# Patient Record
Sex: FEMALE | URBAN - METROPOLITAN AREA
[De-identification: names, ages, dates, MRNs, and addresses within clinical notes are randomized per-mention and may not be internally consistent; named-entity substitution may affect disease eponyms.]

---

## 2023-10-09 ENCOUNTER — APPOINTMENT (RX ONLY)
Dept: URBAN - METROPOLITAN AREA CLINIC 88 | Facility: CLINIC | Age: 58
Setting detail: DERMATOLOGY
End: 2023-10-09

## 2023-10-09 ENCOUNTER — RX ONLY (OUTPATIENT)
Age: 58
Setting detail: RX ONLY
End: 2023-10-09

## 2023-10-09 DIAGNOSIS — Z01.818 ENCOUNTER FOR OTHER PREPROCEDURAL EXAMINATION: ICD-10-CM

## 2023-10-09 PROCEDURE — ? ADDITIONAL NOTES

## 2023-10-09 RX ORDER — CEPHALEXIN 500 MG/1
CAPSULE ORAL
Qty: 21 | Refills: 2 | Status: ERX | COMMUNITY
Start: 2023-10-09

## 2023-10-09 RX ORDER — OXYCODONE HYDROCHLORIDE AND ACETAMINOPHEN 5; 325 MG/1; MG/1
TABLET ORAL
Qty: 30 | Refills: 0 | Status: ERX | COMMUNITY
Start: 2023-10-09

## 2023-10-09 RX ORDER — APREPITANT 40 MG/1
CAPSULE ORAL
Qty: 1 | Refills: 0 | Status: ERX | COMMUNITY
Start: 2023-10-09

## 2023-10-09 RX ORDER — PROMETHAZINE HYDROCHLORIDE 12.5 MG/1
TABLET ORAL
Qty: 20 | Refills: 0 | Status: ERX | COMMUNITY
Start: 2023-10-09

## 2023-10-09 RX ORDER — CYCLOBENZAPRINE HYDROCHLORIDE 5 MG/1
TABLET, FILM COATED ORAL
Qty: 30 | Refills: 0 | Status: ERX | COMMUNITY
Start: 2023-10-09

## 2023-10-09 NOTE — PROCEDURE: ADDITIONAL NOTES
Render Risk Assessment In Note?: no
Additional Notes: Phone Preop\\nSx Date 10/19/23 - Tummy tuck lipo Dog ear excision right lateral breast +Seb cyst on R shoulder (Per Dr. Anny Willis, he will be removing it)\\nHT 5'2  lbs\\nAllergies: NKA only seasonal\\nPMH: No cvd hx, no respiratory hx, no dvt hx. High Cholesterol (no meds). Denies any other PMH. \\nHospitalizations: Denies\\nMeds: Metformin (Hypoglycemia, No DM). Stopped vitamins recently. Estrogen pill stopped. Progesterone pill. \\nHx of Nausea after sx: Denies\\nSmoke/Vape/Hookah: Denies\\nETOH: 1-2/week\\nStop supplements prior to sx and blood thinners\\nNicotine test day of sx\\n\\n[place], REINIER Douglas\\n\\n[facility] will be taking care of her\\nMedical clearance orders sent by Jennie Stephenson and resent by CDW today. \\nPost op instructions sent via email per Pt request CDW.

## 2023-10-19 ENCOUNTER — APPOINTMENT (RX ONLY)
Dept: URBAN - METROPOLITAN AREA CLINIC 88 | Facility: CLINIC | Age: 58
Setting detail: DERMATOLOGY
End: 2023-10-19

## 2023-10-19 PROCEDURE — ? OPERATIVE NOTE - TRANSCRIPT

## 2023-10-20 ENCOUNTER — APPOINTMENT (RX ONLY)
Dept: URBAN - METROPOLITAN AREA CLINIC 88 | Facility: CLINIC | Age: 58
Setting detail: DERMATOLOGY
End: 2023-10-20

## 2023-10-23 ENCOUNTER — APPOINTMENT (RX ONLY)
Dept: URBAN - METROPOLITAN AREA CLINIC 88 | Facility: CLINIC | Age: 58
Setting detail: DERMATOLOGY
End: 2023-10-23

## 2023-10-26 ENCOUNTER — APPOINTMENT (RX ONLY)
Dept: URBAN - METROPOLITAN AREA CLINIC 88 | Facility: CLINIC | Age: 58
Setting detail: DERMATOLOGY
End: 2023-10-26

## 2023-10-26 ENCOUNTER — RX ONLY (OUTPATIENT)
Age: 58
Setting detail: RX ONLY
End: 2023-10-26

## 2023-10-26 PROCEDURE — 99024 POSTOP FOLLOW-UP VISIT: CPT

## 2023-10-26 PROCEDURE — ? COUNSELING - POST-OP CHECK, LIPOSUCTION

## 2023-10-26 PROCEDURE — ? COUNSELING - POST-OP CHECK, ABDOMINOPLASTY

## 2023-10-26 RX ORDER — CYCLOBENZAPRINE HYDROCHLORIDE 5 MG/1
TABLET, FILM COATED ORAL
Qty: 30 | Refills: 0 | Status: ERX

## 2023-10-26 NOTE — PROCEDURE: COUNSELING - POST-OP CHECK, ABDOMINOPLASTY
Detail Level: Simple
Add Postop Global No-Charge Code (99692)?: yes
Count Minor/Major Decisions Toward Mdm (Not Cosmetic)?: No

## 2023-10-26 NOTE — PROCEDURE: COUNSELING - POST-OP CHECK, LIPOSUCTION
Count Minor/Major Decisions Toward Mdm (Not Cosmetic)?: No
Add Postop Global No-Charge Code (74485)?: yes
Detail Level: Simple